# Patient Record
Sex: FEMALE | URBAN - METROPOLITAN AREA
[De-identification: names, ages, dates, MRNs, and addresses within clinical notes are randomized per-mention and may not be internally consistent; named-entity substitution may affect disease eponyms.]

---

## 2020-08-31 ENCOUNTER — OFFICE (OUTPATIENT)
Dept: URBAN - METROPOLITAN AREA CLINIC 64 | Facility: CLINIC | Age: 45
End: 2020-08-31
Payer: COMMERCIAL

## 2020-08-31 VITALS
HEART RATE: 74 BPM | DIASTOLIC BLOOD PRESSURE: 73 MMHG | WEIGHT: 142 LBS | HEIGHT: 65 IN | SYSTOLIC BLOOD PRESSURE: 126 MMHG

## 2020-08-31 DIAGNOSIS — D50.9 IRON DEFICIENCY ANEMIA, UNSPECIFIED: ICD-10-CM

## 2020-08-31 DIAGNOSIS — R10.13 EPIGASTRIC PAIN: ICD-10-CM

## 2020-08-31 DIAGNOSIS — R13.10 DYSPHAGIA, UNSPECIFIED: ICD-10-CM

## 2020-08-31 DIAGNOSIS — R11.0 NAUSEA: ICD-10-CM

## 2020-08-31 PROCEDURE — 99203 OFFICE O/P NEW LOW 30 MIN: CPT | Performed by: NURSE PRACTITIONER

## 2024-11-19 ENCOUNTER — HOSPITAL ENCOUNTER (OUTPATIENT)
Facility: HOSPITAL | Age: 49
Discharge: HOME | End: 2024-11-20
Attending: EMERGENCY MEDICINE | Admitting: EMERGENCY MEDICINE
Payer: MEDICAID

## 2024-11-19 DIAGNOSIS — F12.921 MARIJUANA INTOXICATION, WITH DELIRIUM: Primary | ICD-10-CM

## 2024-11-19 LAB
ALBUMIN SERPL-MCNC: 4 G/DL (ref 3.5–5.2)
ALBUMIN/GLOB SERPL: 1.2 G/DL
ALP SERPL-CCNC: 118 U/L (ref 39–117)
ALT SERPL W P-5'-P-CCNC: 16 U/L (ref 1–33)
AMPHET+METHAMPHET UR QL: NEGATIVE
AMPHETAMINES UR QL: NEGATIVE
ANION GAP SERPL CALCULATED.3IONS-SCNC: 13.8 MMOL/L (ref 5–15)
AST SERPL-CCNC: 29 U/L (ref 1–32)
BARBITURATES UR QL SCN: NEGATIVE
BASOPHILS # BLD AUTO: 0.04 10*3/MM3 (ref 0–0.2)
BASOPHILS NFR BLD AUTO: 0.7 % (ref 0–1.5)
BENZODIAZ UR QL SCN: NEGATIVE
BILIRUB SERPL-MCNC: <0.2 MG/DL (ref 0–1.2)
BUN SERPL-MCNC: 14 MG/DL (ref 6–20)
BUN/CREAT SERPL: 20.6 (ref 7–25)
BUPRENORPHINE SERPL-MCNC: NEGATIVE NG/ML
CALCIUM SPEC-SCNC: 9.1 MG/DL (ref 8.6–10.5)
CANNABINOIDS SERPL QL: POSITIVE
CHLORIDE SERPL-SCNC: 97 MMOL/L (ref 98–107)
CO2 SERPL-SCNC: 21.2 MMOL/L (ref 22–29)
COCAINE UR QL: NEGATIVE
CREAT SERPL-MCNC: 0.68 MG/DL (ref 0.57–1)
DEPRECATED RDW RBC AUTO: 36.7 FL (ref 37–54)
EGFRCR SERPLBLD CKD-EPI 2021: 107.6 ML/MIN/1.73
EOSINOPHIL # BLD AUTO: 0.08 10*3/MM3 (ref 0–0.4)
EOSINOPHIL NFR BLD AUTO: 1.4 % (ref 0.3–6.2)
ERYTHROCYTE [DISTWIDTH] IN BLOOD BY AUTOMATED COUNT: 11.4 % (ref 12.3–15.4)
GLOBULIN UR ELPH-MCNC: 3.4 GM/DL
GLUCOSE SERPL-MCNC: 137 MG/DL (ref 65–99)
HCT VFR BLD AUTO: 39.3 % (ref 34–46.6)
HGB BLD-MCNC: 13.6 G/DL (ref 12–15.9)
HOLD SPECIMEN: NORMAL
IMM GRANULOCYTES # BLD AUTO: 0.01 10*3/MM3 (ref 0–0.05)
IMM GRANULOCYTES NFR BLD AUTO: 0.2 % (ref 0–0.5)
LYMPHOCYTES # BLD AUTO: 3.46 10*3/MM3 (ref 0.7–3.1)
LYMPHOCYTES NFR BLD AUTO: 59 % (ref 19.6–45.3)
MCH RBC QN AUTO: 30 PG (ref 26.6–33)
MCHC RBC AUTO-ENTMCNC: 34.6 G/DL (ref 31.5–35.7)
MCV RBC AUTO: 86.8 FL (ref 79–97)
METHADONE UR QL SCN: NEGATIVE
MONOCYTES # BLD AUTO: 0.54 10*3/MM3 (ref 0.1–0.9)
MONOCYTES NFR BLD AUTO: 9.2 % (ref 5–12)
NEUTROPHILS NFR BLD AUTO: 1.73 10*3/MM3 (ref 1.7–7)
NEUTROPHILS NFR BLD AUTO: 29.5 % (ref 42.7–76)
NRBC BLD AUTO-RTO: 0 /100 WBC (ref 0–0.2)
OPIATES UR QL: NEGATIVE
OXYCODONE UR QL SCN: NEGATIVE
PCP UR QL SCN: NEGATIVE
PLATELET # BLD AUTO: 256 10*3/MM3 (ref 140–450)
PMV BLD AUTO: 10.5 FL (ref 6–12)
POTASSIUM SERPL-SCNC: 3.7 MMOL/L (ref 3.5–5.2)
PROT SERPL-MCNC: 7.4 G/DL (ref 6–8.5)
RBC # BLD AUTO: 4.53 10*6/MM3 (ref 3.77–5.28)
SODIUM SERPL-SCNC: 132 MMOL/L (ref 136–145)
TRICYCLICS UR QL SCN: POSITIVE
WBC NRBC COR # BLD AUTO: 5.86 10*3/MM3 (ref 3.4–10.8)

## 2024-11-19 PROCEDURE — 96375 TX/PRO/DX INJ NEW DRUG ADDON: CPT

## 2024-11-19 PROCEDURE — 81003 URINALYSIS AUTO W/O SCOPE: CPT | Performed by: PHYSICIAN ASSISTANT

## 2024-11-19 PROCEDURE — 25010000002 DIPHENHYDRAMINE PER 50 MG: Performed by: NURSE PRACTITIONER

## 2024-11-19 PROCEDURE — P9612 CATHETERIZE FOR URINE SPEC: HCPCS

## 2024-11-19 PROCEDURE — 25810000003 SODIUM CHLORIDE 0.9 % SOLUTION: Performed by: NURSE PRACTITIONER

## 2024-11-19 PROCEDURE — 80053 COMPREHEN METABOLIC PANEL: CPT | Performed by: NURSE PRACTITIONER

## 2024-11-19 PROCEDURE — 25010000002 PROCHLORPERAZINE 10 MG/2ML SOLUTION: Performed by: NURSE PRACTITIONER

## 2024-11-19 PROCEDURE — 96374 THER/PROPH/DIAG INJ IV PUSH: CPT

## 2024-11-19 PROCEDURE — 80306 DRUG TEST PRSMV INSTRMNT: CPT | Performed by: NURSE PRACTITIONER

## 2024-11-19 PROCEDURE — 85025 COMPLETE CBC W/AUTO DIFF WBC: CPT | Performed by: NURSE PRACTITIONER

## 2024-11-19 PROCEDURE — 99285 EMERGENCY DEPT VISIT HI MDM: CPT

## 2024-11-19 RX ORDER — SODIUM CHLORIDE 0.9 % (FLUSH) 0.9 %
10 SYRINGE (ML) INJECTION AS NEEDED
Status: DISCONTINUED | OUTPATIENT
Start: 2024-11-19 | End: 2024-11-20 | Stop reason: HOSPADM

## 2024-11-19 RX ORDER — DIPHENHYDRAMINE HYDROCHLORIDE 50 MG/ML
25 INJECTION INTRAMUSCULAR; INTRAVENOUS ONCE
Status: COMPLETED | OUTPATIENT
Start: 2024-11-19 | End: 2024-11-19

## 2024-11-19 RX ORDER — PROCHLORPERAZINE EDISYLATE 5 MG/ML
5 INJECTION INTRAMUSCULAR; INTRAVENOUS ONCE
Status: COMPLETED | OUTPATIENT
Start: 2024-11-19 | End: 2024-11-19

## 2024-11-19 RX ADMIN — SODIUM CHLORIDE 500 ML: 9 INJECTION, SOLUTION INTRAVENOUS at 22:07

## 2024-11-19 RX ADMIN — DIPHENHYDRAMINE HYDROCHLORIDE 25 MG: 50 INJECTION, SOLUTION INTRAMUSCULAR; INTRAVENOUS at 22:19

## 2024-11-19 RX ADMIN — PROCHLORPERAZINE EDISYLATE 5 MG: 5 INJECTION INTRAMUSCULAR; INTRAVENOUS at 22:19

## 2024-11-19 NOTE — LETTER
November 20, 2024      [facility]  [facility] [place] [address] [phone]          Patient: Otis Daniel   YOB: 1975   Date of Visit: 11/19/2024       To Whom It May Concern:    Otis Daniel was seen at [facility] on 11/19/2024 to 11/20/2024    Please excuse Shanice Ying from work today 11/20/2024        Sincerely,                Obi Lopez PA-C/ Sherron PARRYN RN

## 2024-11-19 NOTE — LETTER
Caller:Alonzo    Doctor/Office:      Caller asked to speak to: [facility]     Call was transferred to: [facility] November 20, 2024     Patient: Otsi Daniel   YOB: 1975   Date of Visit: 11/19/2024       To Whom It May Concern:    It is my medical opinion that Otis Daniel may return to work on 11- .           Sincerely,                  Obi Lopez PA-C/ Sherron PARRYN, RN

## 2024-11-19 NOTE — Clinical Note
November 20, 2024     Patient: Otis Daniel   YOB: 1975   Date of Visit: 11/19/2024       To Whom It May Concern:    It is my medical opinion that Otis Daniel {Work release (duty restriction):00928}.           Sincerely,        No name on file    CC: No Recipients

## 2024-11-19 NOTE — LETTER
November 20, 2024      [facility] OBSERVATION  [facility] [place] [address] [phone]          Patient: Otis Daniel   YOB: 1975   Date of Visit: 11/19/2024       To Whom It May Concern:    Otis Daniel was seen at [facility] on 11/19/2024 to 11/20/2024    Please excuse Grady Daniel from work today 11/20/2024        Sincerely,     Obi Lopez PA-C/Sherron PARRYN RN

## 2024-11-19 NOTE — Clinical Note
[facility] EMERGENCY DEPARTMENT  [address] [facility] [place] [address]  Phone: [phone]    Otis Daniel was seen and treated in our emergency department on 11/19/2024.  She may return to work on 11/22/2024.         Thank you for choosing [facility].    Monica Tamayo RN

## 2024-11-20 ENCOUNTER — APPOINTMENT (OUTPATIENT)
Dept: GENERAL RADIOLOGY | Facility: HOSPITAL | Age: 49
End: 2024-11-20
Payer: MEDICAID

## 2024-11-20 VITALS
HEIGHT: 63 IN | WEIGHT: 148.81 LBS | TEMPERATURE: 98.2 F | DIASTOLIC BLOOD PRESSURE: 70 MMHG | OXYGEN SATURATION: 100 % | RESPIRATION RATE: 18 BRPM | HEART RATE: 82 BPM | BODY MASS INDEX: 26.37 KG/M2 | SYSTOLIC BLOOD PRESSURE: 112 MMHG

## 2024-11-20 PROBLEM — T50.901A DRUG INGESTION, ACCIDENTAL: Status: ACTIVE | Noted: 2024-11-20

## 2024-11-20 LAB
BILIRUB UR QL STRIP: NEGATIVE
CLARITY UR: CLEAR
COLOR UR: YELLOW
GLUCOSE UR STRIP-MCNC: NEGATIVE MG/DL
HGB UR QL STRIP.AUTO: NEGATIVE
KETONES UR QL STRIP: NEGATIVE
LEUKOCYTE ESTERASE UR QL STRIP.AUTO: NEGATIVE
NITRITE UR QL STRIP: NEGATIVE
PH UR STRIP.AUTO: 7 [PH] (ref 5–8)
PROT UR QL STRIP: NEGATIVE
SP GR UR STRIP: 1.02 (ref 1–1.03)
UROBILINOGEN UR QL STRIP: NORMAL

## 2024-11-20 PROCEDURE — G0378 HOSPITAL OBSERVATION PER HR: HCPCS

## 2024-11-20 PROCEDURE — 71045 X-RAY EXAM CHEST 1 VIEW: CPT

## 2024-11-20 RX ORDER — SODIUM CHLORIDE 9 MG/ML
40 INJECTION, SOLUTION INTRAVENOUS AS NEEDED
Status: DISCONTINUED | OUTPATIENT
Start: 2024-11-20 | End: 2024-11-20 | Stop reason: HOSPADM

## 2024-11-20 RX ORDER — BISACODYL 5 MG/1
5 TABLET, DELAYED RELEASE ORAL DAILY PRN
Status: DISCONTINUED | OUTPATIENT
Start: 2024-11-20 | End: 2024-11-20 | Stop reason: HOSPADM

## 2024-11-20 RX ORDER — NITROGLYCERIN 0.4 MG/1
0.4 TABLET SUBLINGUAL
Status: DISCONTINUED | OUTPATIENT
Start: 2024-11-20 | End: 2024-11-20 | Stop reason: HOSPADM

## 2024-11-20 RX ORDER — AMOXICILLIN 250 MG
2 CAPSULE ORAL 2 TIMES DAILY PRN
Status: DISCONTINUED | OUTPATIENT
Start: 2024-11-20 | End: 2024-11-20 | Stop reason: HOSPADM

## 2024-11-20 RX ORDER — SODIUM CHLORIDE 0.9 % (FLUSH) 0.9 %
10 SYRINGE (ML) INJECTION AS NEEDED
Status: DISCONTINUED | OUTPATIENT
Start: 2024-11-20 | End: 2024-11-20 | Stop reason: HOSPADM

## 2024-11-20 RX ORDER — PHENAZOPYRIDINE HYDROCHLORIDE 100 MG/1
100 TABLET, FILM COATED ORAL
Qty: 6 TABLET | Refills: 0 | Status: SHIPPED | OUTPATIENT
Start: 2024-11-20 | End: 2024-11-22

## 2024-11-20 RX ORDER — POLYETHYLENE GLYCOL 3350 17 G/17G
17 POWDER, FOR SOLUTION ORAL DAILY PRN
Status: DISCONTINUED | OUTPATIENT
Start: 2024-11-20 | End: 2024-11-20 | Stop reason: HOSPADM

## 2024-11-20 RX ORDER — ONDANSETRON 2 MG/ML
4 INJECTION INTRAMUSCULAR; INTRAVENOUS EVERY 6 HOURS PRN
Status: DISCONTINUED | OUTPATIENT
Start: 2024-11-20 | End: 2024-11-20 | Stop reason: HOSPADM

## 2024-11-20 RX ORDER — PHENAZOPYRIDINE HYDROCHLORIDE 100 MG/1
100 TABLET, FILM COATED ORAL
Status: DISCONTINUED | OUTPATIENT
Start: 2024-11-20 | End: 2024-11-20 | Stop reason: HOSPADM

## 2024-11-20 RX ORDER — SODIUM CHLORIDE 0.9 % (FLUSH) 0.9 %
10 SYRINGE (ML) INJECTION EVERY 12 HOURS SCHEDULED
Status: DISCONTINUED | OUTPATIENT
Start: 2024-11-20 | End: 2024-11-20 | Stop reason: HOSPADM

## 2024-11-20 RX ORDER — BISACODYL 10 MG
10 SUPPOSITORY, RECTAL RECTAL DAILY PRN
Status: DISCONTINUED | OUTPATIENT
Start: 2024-11-20 | End: 2024-11-20 | Stop reason: HOSPADM

## 2024-11-20 RX ADMIN — PHENAZOPYRIDINE HYDROCHLORIDE 100 MG: 100 TABLET ORAL at 09:00

## 2024-11-20 RX ADMIN — Medication 10 ML: at 09:02

## 2024-11-20 NOTE — DISCHARGE INSTRUCTIONS
Do not use edibles    Do not use drugs    Push clear liquids    Follow-up with primary care for any further problems return to the ER if worse

## 2024-11-20 NOTE — DISCHARGE SUMMARY
[facility]    Provider, No Known    CHIEF COMPLAINT:     Altered mental status    HISTORY OF PRESENT ILLNESS:    Obtained from ED provide HPI on 11/19/2024:  Patient is a 49-year-old  female who comes in via EMS that is crying and will not speak to us will not open her eyes who can give no history for ingestion of THC edible approximately 1 hour prior to arrival    11/20/24:  Patient is alert and oriented time my exam and all communication performed through  [id number].  She confirms that she got off work on the day of presentation and came home and used a THC gummy which she has never used before and subsequently experienced altered mental status.  She has noted some dysuria but denies any other new or acute complaints and reports no concern regarding the possibility of STI.  Patient does not smoke and drinks alcohol he occasionally.  No other new or acute symptoms are noted.  She denies any recent fevers.            History reviewed. No pertinent past medical history.  History reviewed. No pertinent surgical history.  History reviewed. No pertinent family history.  Social History     Tobacco Use    Smoking status: Never     Passive exposure: Never    Smokeless tobacco: Never   Vaping Use    Vaping status: Never Used   Substance Use Topics    Alcohol use: Never    Drug use: Never     No medications prior to admission.     Allergies:  Patient has no known allergies.    There is no immunization history for the selected administration types on file for this patient.        REVIEW OF SYSTEMS:    Review of Systems   Constitutional: Negative.   HENT: Negative.     Eyes: Negative.    Cardiovascular: Negative.    Respiratory: Negative.     Skin: Negative.    Musculoskeletal: Negative.    Gastrointestinal: Negative.    Genitourinary:  Positive for dysuria.   Neurological: Negative.    Psychiatric/Behavioral:  Positive for altered mental status.        Vital Signs  Temp:  [98.2 °F (36.8  °C)-98.8 °F (37.1 °C)] 98.2 °F (36.8 °C)  Heart Rate:  [] 82  Resp:  [13-34] 18  BP: (111-161)/() 112/70          Physical Exam:  Physical Exam  Vitals reviewed.   Constitutional:       General: She is not in acute distress.     Appearance: Normal appearance. She is normal weight. She is not ill-appearing, toxic-appearing or diaphoretic.   HENT:      Head: Normocephalic.      Right Ear: External ear normal.      Left Ear: External ear normal.      Nose: Nose normal.      Mouth/Throat:      Mouth: Mucous membranes are moist.   Eyes:      Extraocular Movements: Extraocular movements intact.   Cardiovascular:      Rate and Rhythm: Normal rate and regular rhythm.      Pulses: Normal pulses.   Pulmonary:      Effort: Pulmonary effort is normal.      Breath sounds: Normal breath sounds.   Abdominal:      General: Bowel sounds are normal.      Palpations: Abdomen is soft.   Musculoskeletal:      Cervical back: Normal range of motion.      Right lower leg: No edema.      Left lower leg: No edema.   Skin:     General: Skin is warm and dry.      Capillary Refill: Capillary refill takes less than 2 seconds.   Neurological:      General: No focal deficit present.      Mental Status: She is alert and oriented to person, place, and time.   Psychiatric:         Mood and Affect: Mood normal.         Behavior: Behavior normal.         Thought Content: Thought content normal.         Judgment: Judgment normal.         Emotional Behavior:   Normal   Debilities:  None  Results Review:    I reviewed the patient's new clinical results.  Lab Results (most recent)       Procedure Component Value Units Date/Time    Urinalysis With Culture If Indicated - Straight Cath [[id number]]  (Normal) Collected: 11/19/24 2211    Specimen: Urine from Straight Cath Updated: 11/20/24 0843     Color, UA Yellow     Appearance, UA Clear     pH, UA 7.0     Specific Gravity, UA 1.019     Glucose, UA Negative     Ketones, UA Negative     Bilirubin,  UA Negative     Blood, UA Negative     Protein, UA Negative     Leuk Esterase, UA Negative     Nitrite, UA Negative     Urobilinogen, UA 0.2 E.U./dL    Narrative:      In absence of clinical symptoms, the presence of pyuria, bacteria, and/or nitrites on the urinalysis result does not correlate with infection.  Urine microscopic not indicated.    Urine Drug Screen - Straight Cath [[id number]]  (Abnormal) Collected: 11/19/24 2214    Specimen: Urine from Straight Cath Updated: 11/19/24 2233     THC, Screen, Urine Positive     Phencyclidine (PCP), Urine Negative     Cocaine Screen, Urine Negative     Methamphetamine, Ur Negative     Opiate Screen Negative     Amphetamine Screen, Urine Negative     Benzodiazepine Screen, Urine Negative     Tricyclic Antidepressants Screen Positive     Methadone Screen, Urine Negative     Barbiturates Screen, Urine Negative     Oxycodone Screen, Urine Negative     Buprenorphine, Screen, Urine Negative    Narrative:      Cutoff For Drugs Screened:    Amphetamines               500 ng/ml  Barbiturates               200 ng/ml  Benzodiazepines            150 ng/ml  Cocaine                    150 ng/ml  Methadone                  200 ng/ml  Opiates                    100 ng/ml  Phencyclidine               25 ng/ml  THC                         50 ng/ml  Methamphetamine            500 ng/ml  Tricyclic Antidepressants  300 ng/ml  Oxycodone                  100 ng/ml  Buprenorphine               10 ng/ml    The normal value for all drugs tested is negative. This report includes unconfirmed screening results, with the cutoff values listed, to be used for medical treatment purposes only.  Unconfirmed results must not be used for non-medical purposes such as employment or legal testing.  Clinical consideration should be applied to any drug of abuse test, particularly when unconfirmed results are used.    All urine drugs of abuse requests without chain of custody are for medical screening purposes  only.  False positives are possible.      Comprehensive Metabolic Panel [[id number]]  (Abnormal) Collected: 11/19/24 2203    Specimen: Blood from Arm, Left Updated: 11/19/24 2228     Glucose 137 mg/dL      BUN 14 mg/dL      Creatinine 0.68 mg/dL      Sodium 132 mmol/L      Potassium 3.7 mmol/L      Comment: Specimen hemolyzed.  Result may be falsely elevated.        Chloride 97 mmol/L      CO2 21.2 mmol/L      Calcium 9.1 mg/dL      Total Protein 7.4 g/dL      Albumin 4.0 g/dL      ALT (SGPT) 16 U/L      Comment: Specimen hemolyzed.  Result may  be falsely elevated.        AST (SGOT) 29 U/L      Comment: Specimen hemolyzed.  Result may be falsely elevated.        Alkaline Phosphatase 118 U/L      Total Bilirubin <0.2 mg/dL      Globulin 3.4 gm/dL      A/G Ratio 1.2 g/dL      BUN/Creatinine Ratio 20.6     Anion Gap 13.8 mmol/L      eGFR 107.6 mL/min/1.73     Narrative:      GFR Normal >60  Chronic Kidney Disease <60  Kidney Failure <15      Extra Tubes [[id number]] Collected: 11/19/24 2203    Specimen: Blood from Arm, Left Updated: 11/19/24 2215    Narrative:      The following orders were created for panel order Extra Tubes.  Procedure                               Abnormality         Status                     ---------                               -----------         ------                     Gold Top - SST[[id number]]                                   Final result                 Please view results for these tests on the individual orders.    Gold Top - SST [[id number]] Collected: 11/19/24 2203    Specimen: Blood from Arm, Left Updated: 11/19/24 2215     Extra Tube Hold for add-ons.     Comment: Auto resulted.       CBC & Differential [[id number]]  (Abnormal) Collected: 11/19/24 2203    Specimen: Blood from Arm, Left Updated: 11/19/24 2209    Narrative:      The following orders were created for panel order CBC & Differential.  Procedure                               Abnormality         Status                      ---------                               -----------         ------                     CBC Auto Differential[[id number]]        Abnormal            Final result               Scan Slide[[id number]]                                                                    Please view results for these tests on the individual orders.    CBC Auto Differential [[id number]]  (Abnormal) Collected: 11/19/24 2203    Specimen: Blood from Arm, Left Updated: 11/19/24 2209     WBC 5.86 10*3/mm3      RBC 4.53 10*6/mm3      Hemoglobin 13.6 g/dL      Hematocrit 39.3 %      MCV 86.8 fL      MCH 30.0 pg      MCHC 34.6 g/dL      RDW 11.4 %      RDW-SD 36.7 fl      MPV 10.5 fL      Platelets 256 10*3/mm3      Neutrophil % 29.5 %      Lymphocyte % 59.0 %      Monocyte % 9.2 %      Eosinophil % 1.4 %      Basophil % 0.7 %      Immature Grans % 0.2 %      Neutrophils, Absolute 1.73 10*3/mm3      Lymphocytes, Absolute 3.46 10*3/mm3      Monocytes, Absolute 0.54 10*3/mm3      Eosinophils, Absolute 0.08 10*3/mm3      Basophils, Absolute 0.04 10*3/mm3      Immature Grans, Absolute 0.01 10*3/mm3      nRBC 0.0 /100 WBC             Imaging Results (Most Recent)       Procedure Component Value Units Date/Time    XR Chest 1 View [[id number]] Collected: 11/20/24 0350     Updated: 11/20/24 0352    Narrative:      XR CHEST 1 VW    Date of Exam: 11/20/2024 3:37 AM EST    Indication: dyspnea    Comparison: None available.    Findings:  There are no airspace consolidations. No pleural fluid. No pneumothorax. The pulmonary vasculature appears within normal limits. The cardiac and mediastinal silhouette appear unremarkable. No acute osseous abnormality identified.      Impression:      Impression:  No acute cardiopulmonary process.        Electronically Signed: Chela Fu MD    11/20/2024 3:50 AM EST    Workstation ID: [id number]          reviewed    ECG/EMG Results (most recent)       None          not reviewed            Microbiology Results (last 10 days)        ** No results found for the last 240 hours. **            Assessment & Plan     Drug ingestion, accidental       Altered mental status with accidental drug ingestion  -Patient confirms that she used a THC gummy for the first time prior to symptom onset  -CMP showed mild hyponatremia of 132 with a glucose of 137 with remainder of CMP and CBC being generally unremarkable  -UA unremarkable  -Urine tox positive for THC and tricyclic antidepressants  -Chest x-ray showed no acute cardiopulmonary findings  -In the ED patient is given 500 mL liter fluid bolus along with Benadryl and Compazine  -Symptoms seem to have resolved and patient currently ANO x 3, has been up walking about the unit without abnormalities    I discussed the patients findings and my recommendations with patient and nursing staff.     Discharge Diagnosis:      Drug ingestion, accidental      Hospital Course  Patient is a 49 y.o. female presented with altered mental status with an HPI noted above.  CMP showed a mild hyponatremia with sodium of 132 and a glucose of 137 with remainder of CMP and CBC being generally unremarkable.  Urine tox was ordered in the ED which was positive for THC as well as tricyclic antidepressants but was otherwise unremarkable and chest x-ray showed no acute cardiopulmonary findings.  Patient did report some recent dysuria however UA was generally unremarkable and she was given Pyridium during her admission.  Patient also received a 500 mL liter fluid bolus as well as Compazine and Benadryl in the ED.  Patient was able to rest well overnight and was found to be alert and oriented x 3 on the morning following admission.  She has been able to walk about the unit to and from the restroom without obvious abnormalities noted.  At this time she is felt to be in good condition for discharge with close follow-up with her PCP on an outpatient basis.  Her full testing/results and plan were discussed with patient along with  concerning/alarm symptoms for which to call 911/return to the ED and she is instructed to avoid all illicit substances including THC Gummies in the future.  All questions were answered and she verbalizes her understanding and agreement.    Past Medical History:   History reviewed. No pertinent past medical history.    Past Surgical History:   History reviewed. No pertinent surgical history.    Social History:   Social History     Socioeconomic History    Marital status: Unknown   Tobacco Use    Smoking status: Never     Passive exposure: Never    Smokeless tobacco: Never   Vaping Use    Vaping status: Never Used   Substance and Sexual Activity    Alcohol use: Never    Drug use: Never    Sexual activity: Defer       Procedures Performed         Consults:   Consults       No orders found for last 30 day(s).            Condition on Discharge:     Stable    Discharge Disposition  Home or Self Care    Discharge Medications     Discharge Medications        New Medications        Instructions Start Date   phenazopyridine 100 MG tablet  Commonly known as: PYRIDIUM   100 mg, Oral, 3 Times Daily With Meals               Discharge Diet:     Activity at Discharge:     Follow-up Appointments  No future appointments.  Additional Instructions for the Follow-ups that You Need to Schedule       Discharge Follow-up with PCP   As directed       Currently Documented PCP:    Provider, No Known    PCP Phone Number:    None     Follow Up Details: 5 to 7 days                Test Results Pending at Discharge  Pending Results       Procedure [Order ID] Specimen - Date/Time    Basic Metabolic Panel [[id number]]     Specimen: Blood     CBC Auto Differential [[id number]]     Specimen: Blood              Risk for Readmission (LACE) Score: 1 (11/20/2024  7:23 AM)      Greater than 30 minutes spent in discharge activities for this patient    Signature:Electronically signed by Obi Lopez PA-C, 11/20/24, 10:54 AM EST.

## 2024-11-20 NOTE — CASE MANAGEMENT/SOCIAL WORK
Case Management Discharge Note      Final Note: Home with family         Selected Continued Care - Discharged on 11/20/2024 Admission date: 11/19/2024 - Discharge disposition: Home or Self Care       Transportation Services  Private: Car    Final Discharge Disposition Code: 01 - home or self-care

## 2024-11-20 NOTE — ED NOTES
Attempted triage via video . Pt rocking back and forth on cot, not answering questions. Family member phone number received by ems, will attempt to call. Pt's family member, Shanice Reagan, can be reached at [phone].

## 2024-11-20 NOTE — ED PROVIDER NOTES
Subjective   History of Present Illness  Patient is a 49-year-old  female who comes in via EMS that is crying and will not speak to us will not open her eyes who can give no history for ingestion of THC edible approximately 1 hour prior to arrival      I spoke to the patient's daughter-in-law who confirmed the above report        Review of Systems   Unable to perform ROS: Mental status change       History reviewed. No pertinent past medical history.    No Known Allergies    History reviewed. No pertinent surgical history.    History reviewed. No pertinent family history.    Social History     Socioeconomic History    Marital status: Unknown           Objective   Physical Exam  Vitals reviewed.   Constitutional:       Appearance: Normal appearance. She is well-developed.   HENT:      Head: Normocephalic and atraumatic.   Eyes:      Conjunctiva/sclera: Conjunctivae normal.      Pupils: Pupils are equal, round, and reactive to light.   Cardiovascular:      Rate and Rhythm: Normal rate and regular rhythm.      Heart sounds: Normal heart sounds.   Pulmonary:      Effort: Pulmonary effort is normal. No respiratory distress.      Breath sounds: Normal breath sounds. No wheezing.   Abdominal:      General: Bowel sounds are normal.      Palpations: Abdomen is soft.      Tenderness: There is no abdominal tenderness.   Musculoskeletal:         General: No deformity. Normal range of motion.      Cervical back: Normal range of motion and neck supple.   Skin:     General: Skin is warm and dry.      Capillary Refill: Capillary refill takes less than 2 seconds.   Neurological:      Mental Status: She is alert and oriented to person, place, and time.      GCS: GCS eye subscore is 4. GCS verbal subscore is 5. GCS motor subscore is 6.      Motor: No weakness.   Psychiatric:         Attention and Perception: She is inattentive.         Mood and Affect: Mood normal. Mood is anxious. Affect is tearful.         Speech: Speech is  slurred.         Behavior: Behavior normal. Behavior is uncooperative.         Procedures           ED Course  ED Course as of 11/20/24 0406   Tue Nov 19, 2024 2202 Shanice Garcia [phone]-who is the patient's daughter-in-law states the patient took a THC edible between 830 and 9:00 tonight she is unsure of the strength [KW]   2328 Patient resting comfortably after Benadryl and Compazine blood pressure is stable heart rate down to 110 [KW]   Wed Nov 20, 2024   0020 Continues to rest comfortably [KW]   0037 Plan for d/c at 3am [LB]   0257 Pt had urinary retention with 800ml on bladder scan pt had straight cath 800 out and feels improved.  [LB]   0304 Pt still remains very sleepy, she will rouse to voice at times, but goes back to sleepl    She does get hypoxic when she sleeps. CXR ordered.     Will place in ed observation for continued monitoring tonight, in hopes she is more awake, alert tomorrow and able to be discharged . [LB]   0316 Spoke with Shanice Reagan, about the patient.  She does report she can come get the patient, but she would need a note for work as she would have to go in late the patient is ready for discharge [LB]      ED Course User Index  [KW] Berenice Mix APRN  [LB] Coleen Gilliland, YAKOV                                           /79   Pulse 81   Temp 98.4 °F (36.9 °C) (Rectal)   Resp 14   Wt 68 kg (150 lb)   SpO2 100%   Labs Reviewed   COMPREHENSIVE METABOLIC PANEL - Abnormal; Notable for the following components:       Result Value    Glucose 137 (*)     Sodium 132 (*)     Chloride 97 (*)     CO2 21.2 (*)     Alkaline Phosphatase 118 (*)     All other components within normal limits    Narrative:     GFR Normal >60  Chronic Kidney Disease <60  Kidney Failure <15     URINE DRUG SCREEN - Abnormal; Notable for the following components:    THC, Screen, Urine Positive (*)     Tricyclic Antidepressants Screen Positive (*)     All other components within normal limits     Narrative:     Cutoff For Drugs Screened:    Amphetamines               500 ng/ml  Barbiturates               200 ng/ml  Benzodiazepines            150 ng/ml  Cocaine                    150 ng/ml  Methadone                  200 ng/ml  Opiates                    100 ng/ml  Phencyclidine               25 ng/ml  THC                         50 ng/ml  Methamphetamine            500 ng/ml  Tricyclic Antidepressants  300 ng/ml  Oxycodone                  100 ng/ml  Buprenorphine               10 ng/ml    The normal value for all drugs tested is negative. This report includes unconfirmed screening results, with the cutoff values listed, to be used for medical treatment purposes only.  Unconfirmed results must not be used for non-medical purposes such as employment or legal testing.  Clinical consideration should be applied to any drug of abuse test, particularly when unconfirmed results are used.    All urine drugs of abuse requests without chain of custody are for medical screening purposes only.  False positives are possible.     CBC WITH AUTO DIFFERENTIAL - Abnormal; Notable for the following components:    RDW 11.4 (*)     RDW-SD 36.7 (*)     Neutrophil % 29.5 (*)     Lymphocyte % 59.0 (*)     Lymphocytes, Absolute 3.46 (*)     All other components within normal limits   CBC AND DIFFERENTIAL    Narrative:     The following orders were created for panel order CBC & Differential.  Procedure                               Abnormality         Status                     ---------                               -----------         ------                     CBC Auto Differential[[id number]]        Abnormal            Final result               Scan Slide[[id number]]                                                                    Please view results for these tests on the individual orders.   EXTRA TUBES    Narrative:     The following orders were created for panel order Extra Tubes.  Procedure                                Abnormality         Status                     ---------                               -----------         ------                     Gold Top - SST[[id number]]                                   Final result                 Please view results for these tests on the individual orders.   GOLD TOP - SST     Medications   sodium chloride 0.9 % flush 10 mL (has no administration in time range)   sodium chloride 0.9 % bolus 500 mL (0 mL Intravenous Stopped 11/20/24 0025)   prochlorperazine (COMPAZINE) injection 5 mg (5 mg Intravenous Given 11/19/24 2219)   diphenhydrAMINE (BENADRYL) injection 25 mg (25 mg Intravenous Given 11/19/24 2219)       XR Chest 1 View    Result Date: 11/20/2024  Impression: No acute cardiopulmonary process. Electronically Signed: Chela Fu MD  11/20/2024 3:50 AM EST  Workstation ID: [id number]               Medical Decision Making  Patient is a 49-year-old female who comes in tachycardic tearful uncooperative after ingestion of a THC edible about 1 hour prior to arrival.  Patient had IV established and blood work was obtained and reviewed and found to be essentially normal.  The patient was given Benadryl and Compazine and was then resting comfortably with blood pressure stable and heart rate down to 110  Drug screen is positive for THC and tricyclic antidepressants  Spoke to the patient's daughter-in-law who states she will come get her when it is time-    Assume care of patient.  Pending that she is able to ambulate, awake and alert.    Patient is been monitored in the ED, she does remain sleepy, and not able to ambulate with a steady gait alone.  She occasionally gets hypoxic while she sleeping, she was placed on 2 L, chest x-ray was obtained reveals no acute findings.    Patient was placed in ED observation, in hopes that she is to ambulate, awake and alert tomorrow to be discharged home.    I spoke with the patient's family, she is still agreeable to come get the patient.    Problems  Addressed:  Marijuana intoxication, with delirium: complicated acute illness or injury    Amount and/or Complexity of Data Reviewed  Labs: ordered.  Radiology: ordered.    Risk  Prescription drug management.  Decision regarding hospitalization.        Final diagnoses:   Marijuana intoxication, with delirium       ED Disposition  ED Disposition       ED Disposition   Decision to Admit    Condition   --    Comment   --               [facility]  [phone]  Call   As needed         Medication List      No changes were made to your prescriptions during this visit.            Coleen Gilliland, APRN  11/20/24 0406